# Patient Record
(demographics unavailable — no encounter records)

---

## 2024-11-19 NOTE — ASSESSMENT
[FreeTextEntry1] : discussed etiology and tx options  Continue the stretching  US guided cortisone injection administered to the left medial heel in the usual manner with 4mg dexamethasone acetate #3 Continue the celecoxib HBA1c pending Rx PT TIW x 4 weeks  30 minutes face to face time

## 2024-11-19 NOTE — PHYSICAL EXAM
[General Appearance - In No Acute Distress] : in no acute distress [General Appearance - Alert] : alert [Skin Color & Pigmentation] : normal skin color and pigmentation [Sensation] : the sensory exam was normal to light touch and pinprick [Oriented To Time, Place, And Person] : oriented to person, place, and time [2+] : left foot dorsalis pedis 2+ [FreeTextEntry3] : normal pedal pulses [de-identified] : less pain at the left medial heel

## 2024-11-19 NOTE — HISTORY OF PRESENT ILLNESS
[Stiff Soled Shoes] : stiff soled shoes [FreeTextEntry1] : SERVANDO is a 37-year-old F present in the [place] office for left heel pain. Patient states the pain started when she was pregnant at the time patient was not concern after giving birth patient states the pain has increased and she gets cramping. Patient complaints of a sharp pain. Patient pain scale 1 to 10 is a 6. Patient states it is a consent pain. Patient states the pain is worse when she is sitting for a long period of time and gets up to take her first few steps.  Patient states injection given has help and she is also taking celecoxib

## 2024-12-11 NOTE — HISTORY OF PRESENT ILLNESS
[FreeTextEntry1] : The patient is referred for an upper endoscopy prior to bariatric surgery.  She denies any abdominal pain, nausea, vomiting, diarrhea or constipation.  There is no heartburn or any other dyspeptic symptoms.  She denies any dysphagia.  Her appetite is good.  There is no rectal bleeding.  There is no family history of colon cancer.

## 2024-12-11 NOTE — PHYSICAL EXAM
[Alert] : alert [Normal Voice/Communication] : normal voice/communication [Healthy Appearing] : healthy appearing [No Acute Distress] : no acute distress [Obese (BMI >= 30)] : obese (BMI >= 30) [Sclera] : the sclera and conjunctiva were normal [Hearing Threshold Finger Rub Not [place]] : hearing was normal [Normal Lips/Gums] : the lips and gums were normal [Oropharynx] : the oropharynx was normal [Normal Appearance] : the appearance of the neck was normal [No Respiratory Distress] : no respiratory distress [No Acc Muscle Use] : no accessory muscle use [Respiration, Rhythm And Depth] : normal respiratory rhythm and effort [Auscultation Breath Sounds / Voice Sounds] : lungs were clear to auscultation bilaterally [Heart Rate And Rhythm] : heart rate was normal and rhythm regular [Normal S1, S2] : normal S1 and S2 [Murmurs] : no murmurs [Bowel Sounds] : normal bowel sounds [Abdomen Tenderness] : non-tender [No Masses] : no abdominal mass palpated [Abdomen Soft] : soft [] : no hepatosplenomegaly [Oriented To Time, Place, And Person] : oriented to person, place, and time

## 2024-12-11 NOTE — ASSESSMENT
[FreeTextEntry1] : The patient will be scheduled for an upper endoscopy. The risks, benefits, complications and possible adverse consequences associated with upper endoscopy were discussed with the patient.

## 2025-02-19 NOTE — PHYSICAL EXAM
[Alert] : alert [Normal Voice/Communication] : normal voice/communication [Healthy Appearing] : healthy appearing [No Acute Distress] : no acute distress [Obese (BMI >= 30)] : obese (BMI >= 30) [Sclera] : the sclera and conjunctiva were normal [Hearing Threshold Finger Rub Not [place]] : hearing was normal [Normal Lips/Gums] : the lips and gums were normal [Oropharynx] : the oropharynx was normal [Normal Appearance] : the appearance of the neck was normal [No Respiratory Distress] : no respiratory distress [No Acc Muscle Use] : no accessory muscle use [Respiration, Rhythm And Depth] : normal respiratory rhythm and effort [Auscultation Breath Sounds / Voice Sounds] : lungs were clear to auscultation bilaterally [Heart Rate And Rhythm] : heart rate was normal and rhythm regular [Normal S1, S2] : normal S1 and S2 [Murmurs] : no murmurs [Bowel Sounds] : normal bowel sounds [Abdomen Tenderness] : non-tender [No Masses] : no abdominal mass palpated [Abdomen Soft] : soft [] : no hepatosplenomegaly [Oriented To Time, Place, And Person] : oriented to person, place, and time